# Patient Record
Sex: FEMALE | Race: WHITE
[De-identification: names, ages, dates, MRNs, and addresses within clinical notes are randomized per-mention and may not be internally consistent; named-entity substitution may affect disease eponyms.]

---

## 2018-08-27 ENCOUNTER — HOSPITAL ENCOUNTER (EMERGENCY)
Dept: HOSPITAL 68 - ERH | Age: 38
LOS: 1 days | End: 2018-08-28
Payer: COMMERCIAL

## 2018-08-27 VITALS — WEIGHT: 140 LBS | BODY MASS INDEX: 21.97 KG/M2 | HEIGHT: 67 IN

## 2018-08-27 DIAGNOSIS — R07.89: ICD-10-CM

## 2018-08-27 DIAGNOSIS — F17.210: ICD-10-CM

## 2018-08-27 DIAGNOSIS — J45.909: Primary | ICD-10-CM

## 2018-08-28 VITALS — DIASTOLIC BLOOD PRESSURE: 82 MMHG | SYSTOLIC BLOOD PRESSURE: 133 MMHG

## 2018-08-28 NOTE — RADIOLOGY REPORT
EXAMINATION:
XR CHEST
 
CLINICAL INFORMATION:
Pneumonia
 
COMPARISON:
09/10/2008
 
TECHNIQUE:
2 views of the chest were obtained.
 
FINDINGS:
Lung volumes are symmetric. No focal consolidation is seen. No evidence of
pneumothorax, pleural effusion, or pulmonary edema.
 
The cardiomediastinal contour is unremarkable. No acute osseous findings are
seen.
 
IMPRESSION:
No focal consolidation.

## 2018-08-28 NOTE — ED DYSPNEA/ASTHMA COMPLAINT
History of Present Illness
 
General
Chief Complaint: General Adult
Stated Complaint: COUGH, SOB, HX ASTHMA PER PT O2 98% AT 
Source: patient
Exam Limitations: no limitations
 
Vital Signs & Intake/Output
Vital Signs & Intake/Output
 Vital Signs
 
 
Date Time Temp Pulse Resp B/P B/P Pulse O2 O2 Flow FiO2
 
     Mean Ox Delivery Rate 
 
 0109      99 Room Air  
 
 0108  79 19 133/82  99 Room Air  
 
 0002 97.9 69 20 121/85  97 Room Air  
 
 
 
Allergies
Coded Allergies:
Penicillins (Intermediate, HIVES 18)
 
Reconcile Medications
Albuterol Sulfate (Proair Hfa) 90 MCG HFA.AER.AD   2 PUF INH Q4-6 PRN PRN ASTHMA
 
Triage Note:
TRIAGE: PATIENT TO ER FROM HOME REPORTING +NASAL
 CONGESTION AND NP COUGH "VERY DEEP IN THERE" SINCE
 APPROX SATURDAY. PATIENT REPORTS "FELT LIKE FEVER
 SATURDAY." AFEBRILE IN TRIAGE. PATIENT REPORTING
 SHALLOW BREATHS, UNABLE TO TAKE DEEP BREATH.
 SPEAKING QUIETLY W/ CLEAR SPEECH/ FULL SENTENCES
 IN TRIAGE.
Triage Nurses Notes Reviewed? yes
Pregnant: No
Patient currently breastfeeds: No
HPI:
Patient presents complaining of chest tightness, wheezing and a productive cough
that started earlier this evening.  Patient has a history of intermittent asthma
but she is concerned because her inhaler expiration date has passed.  Patient 
denies any fevers or chills.  There is no orthopnea.  There is no dyspnea on 
exertion.
 
Past History
 
Travel History
Traveled to [place] past 21 day No
 
Medical History
Any Pertinent Medical History? see below for history
Neurological: NONE
EENT: NONE
Cardiovascular: NONE
Respiratory: asthma
Gastrointestinal: NONE
Hepatic: NONE
Renal: NONE
Musculoskeletal: NONE
Psychiatric: NONE
Endocrine: NONE
Blood Disorders: NONE
Cancer(s): NONE
GYN/Reproductive: NONE
 
Surgical History
Surgical History: non-contributory
 
Psychosocial History
What is your primary language English
Tobacco Use: Current Daily Use
Daily Tobacco Use Amount/Type: => 5 Cigarettes daily
ETOH Use: occasional use
Illicit Drug Use: denies illicit drug use
 
Family History
Hx Contributory? No
 
Review of Systems
 
Review of Systems
Constitutional:
Reports: no symptoms. 
Respiratory:
Reports: see HPI, cough, short of breath, wheezing. 
Cardiovascular:
Reports: see HPI, chest pain. 
GI:
Reports: no symptoms. 
Musculoskeletal:
Reports: no symptoms. 
Neurological/Psychological:
Reports: no symptoms. 
Immunologic/Allergic:
Reports: no symptoms. 
 
Physical Exam
 
Physical Exam
General Appearance: well developed/nourished, alert, awake, anxious, mild 
distress
Head: atraumatic, normal appearance
Eyes:
Bilateral: PERRL, EOMI. 
Neck: normal inspection, supple, full range of motion
Respiratory: decreased breath sounds, wheezing, respiratory distress
Cardiovascular: regular rate/rhythm, normal peripheral pulses
Gastrointestinal: normal bowel sounds, soft, non-tender, no organomegaly
Extremities: normal inspection, normal capillary refill, normal range of motion,
no edema
Neurologic/Psych: no motor/sensory deficits, awake, alert, oriented x 3, normal 
gait, normal mood/affect
Skin: intact, normal color, warm/dry
Lymphatic: no anterior cervical michael
 
Core Measures
ACS in differential dx? No
CVA/TIA Diagnosis No
Sepsis Present: No
Sepsis Focused Exam Completed? No
 
Progress
Differential Diagnosis: asthma, bronchitis, pneumonia
Plan of Care:
 Orders
 
 
Procedure Date/time Status
 
XRY-CHEST XRAY, TWO VIEWS 26 Active
 
 
 Current Medications
 
 
  Sig/[place] Start time  Last
 
Medication Dose  Stop Time Status Admin
 
Albuterol Sulfate 3 ML ONCE ONE 30 UNVr 
 
(Proventil)   31  
 
Ipratropium Bromide 2.5 ML ONCE ONE 30 UNVr 
 
(Atrovent)   31  
 
 
 
Diagnostic Imaging:
Viewed by Me: Radiology Read.  Discussed w/RAD: Radiology Read. 
CXR Impression: PATIENT: ROSIBEL CRUZ  MEDICAL RECORD NO: [id number] PRESENT 
AGE: 37  PATIENT ACCOUNT NO: [id number] : 82  LOCATION: [facility] ORDERING 
PHYSICIAN: Javon Graham MD     SERVICE DATE:  EXAM TYPE: RAD 
- XRY-CHEST XRAY, TWO VIEWS EXAMINATION: XR CHEST CLINICAL INFORMATION: 
Pneumonia COMPARISON: 09/10/2008 TECHNIQUE: 2 views of the chest were obtained. 
FINDINGS: Lung volumes are symmetric. No focal consolidation is seen. No 
evidence of pneumothorax, pleural effusion, or pulmonary edema. The 
cardiomediastinal contour is unremarkable. No acute osseous findings are seen. 
IMPRESSION: No focal consolidation. DICTATED BY: Josué Rossi MD  DATE/TIME 
DICTATED:18 :RAD.[facility]  DATE/TIME TRANSCRIBED:57 CONFIDENTIAL, DO NOT COPY WITHOUT APPROPRIATE AUTHORIZATION.  <
Electronically signed in Other Vendor System>                                   
                                                    SIGNED BY: Josué Rossi MD
18 0102
Initial ED EKG: none
 
Departure
 
Departure
Disposition: HOME OR SELF CARE
Condition: Stable
Clinical Impression
Primary Impression: Asthma
Referrals:
Geeta Sweet MD (PCP/Family)
 
Additional Instructions:
Use inhaler as needed.  Return if symptoms worsen or for any concerns.
Departure Forms:
Customer Survey
General Discharge Information
Prescriptions:
Current Visit Scripts
Albuterol Sulfate (Proair Hfa) 2 PUF INH Q4-6 PRN PRN ASTHMA 
     #1 INHAL 
 
 
 
Critical Care Note
 
Critical Care Note
Critical Care Time: non-applicable